# Patient Record
Sex: FEMALE | Race: WHITE | NOT HISPANIC OR LATINO | URBAN - METROPOLITAN AREA
[De-identification: names, ages, dates, MRNs, and addresses within clinical notes are randomized per-mention and may not be internally consistent; named-entity substitution may affect disease eponyms.]

---

## 2017-09-07 ENCOUNTER — IMPORTED ENCOUNTER (OUTPATIENT)
Dept: URBAN - METROPOLITAN AREA CLINIC 50 | Facility: CLINIC | Age: 57
End: 2017-09-07

## 2017-10-05 ENCOUNTER — IMPORTED ENCOUNTER (OUTPATIENT)
Dept: URBAN - METROPOLITAN AREA CLINIC 50 | Facility: CLINIC | Age: 57
End: 2017-10-05

## 2017-10-18 ENCOUNTER — IMPORTED ENCOUNTER (OUTPATIENT)
Dept: URBAN - METROPOLITAN AREA CLINIC 50 | Facility: CLINIC | Age: 57
End: 2017-10-18

## 2017-11-29 ENCOUNTER — IMPORTED ENCOUNTER (OUTPATIENT)
Dept: URBAN - METROPOLITAN AREA CLINIC 50 | Facility: CLINIC | Age: 57
End: 2017-11-29

## 2017-11-29 NOTE — PATIENT DISCUSSION
"""Continue Artificial Tears both eyes two - four times a day. "" ""Continue Izzy Buys both eyes twice a day.  """

## 2017-11-30 ENCOUNTER — IMPORTED ENCOUNTER (OUTPATIENT)
Dept: URBAN - METROPOLITAN AREA CLINIC 50 | Facility: CLINIC | Age: 57
End: 2017-11-30

## 2017-12-20 ENCOUNTER — IMPORTED ENCOUNTER (OUTPATIENT)
Dept: URBAN - METROPOLITAN AREA CLINIC 50 | Facility: CLINIC | Age: 57
End: 2017-12-20

## 2018-01-18 ENCOUNTER — IMPORTED ENCOUNTER (OUTPATIENT)
Dept: URBAN - METROPOLITAN AREA CLINIC 50 | Facility: CLINIC | Age: 58
End: 2018-01-18

## 2018-05-16 ENCOUNTER — IMPORTED ENCOUNTER (OUTPATIENT)
Dept: URBAN - METROPOLITAN AREA CLINIC 50 | Facility: CLINIC | Age: 58
End: 2018-05-16

## 2020-02-20 ENCOUNTER — IMPORTED ENCOUNTER (OUTPATIENT)
Dept: URBAN - METROPOLITAN AREA CLINIC 50 | Facility: CLINIC | Age: 60
End: 2020-02-20

## 2020-10-15 ENCOUNTER — APPOINTMENT (RX ONLY)
Dept: URBAN - METROPOLITAN AREA CLINIC 58 | Facility: CLINIC | Age: 60
Setting detail: DERMATOLOGY
End: 2020-10-15

## 2020-10-15 DIAGNOSIS — K13.0 DISEASES OF LIPS: ICD-10-CM | Status: RESOLVING

## 2020-10-15 PROCEDURE — ? ADDITIONAL NOTES

## 2020-10-15 PROCEDURE — ? COUNSELING

## 2020-10-15 PROCEDURE — ? PRESCRIPTION

## 2020-10-15 PROCEDURE — ? RECOMMENDATIONS

## 2020-10-15 PROCEDURE — 99202 OFFICE O/P NEW SF 15 MIN: CPT

## 2020-10-15 RX ORDER — TRIAMCINOLONE ACETONIDE 0.25 MG/G
OINTMENT TOPICAL
Qty: 2 | Refills: 1 | Status: ERX | COMMUNITY
Start: 2020-10-15

## 2020-10-15 RX ADMIN — TRIAMCINOLONE ACETONIDE: 0.25 OINTMENT TOPICAL at 00:00

## 2020-10-15 ASSESSMENT — LOCATION SIMPLE DESCRIPTION DERM: LOCATION SIMPLE: RIGHT LIP

## 2020-10-15 ASSESSMENT — LOCATION ZONE DERM: LOCATION ZONE: LIP

## 2020-10-15 ASSESSMENT — LOCATION DETAILED DESCRIPTION DERM: LOCATION DETAILED: RIGHT INFERIOR VERMILION LIP

## 2020-10-15 NOTE — PROCEDURE: ADDITIONAL NOTES
Additional Notes: Patient has been getting bad bouts of swollen, red, blistering lips for years. She and her  state that this happens every time that she takes an antibiotic. She frequently takes both oral and IV abx prescribed by pulmonologist for bronchiectasis. Within days of starting levofloxacin or ciprofloxacin, she states that her lips swell, blister, chap, and break. This has also happened in the past with doxycycline but to lesser extent. She was not aware that doxycycline is photosensitizing and she is always outdoors playing [employer] ball sometimes 2-3 times per day without lip protection. Discussed potential serious drug allergy to FQ antibiotic class. She has never discussed this with her pulmonologist. Pulmonologist should be made aware of these reactions for potential for progression to SJS etc. \\n\\nToday, lips are normal size without edema. They are chapped and uncomfortable without blistering. Small residual fissure. Pt and  state that they were recently flared because she had been on a course of Cipro recently and they are much resolved since then. TAC BID X2 weeks for healing, should apply Aquaphor several times daily and SPF lip balm when she is outdoors. Recommended EltaMD Sport for use on the face including lips. Additional Notes: Patient has been getting bad bouts of swollen, red, blistering lips for years. She and her  state that this happens every time that she takes an antibiotic. She frequently takes both oral and IV abx prescribed by pulmonologist for bronchiectasis. Within days of starting levofloxacin or ciprofloxacin, she states that her lips swell, blister, chap, and break. This has also happened in the past with doxycycline but to lesser extent. She was not aware that doxycycline is photosensitizing and she is always outdoors playing [employer] ball sometimes 2-3 times per day without lip protection. Discussed potential serious drug allergy to FQ antibiotic class. She has never discussed this with her pulmonologist. Pulmonologist should be made aware of these reactions for potential for progression to SJS etc. \\n\\nToday, lips are normal size without edema. They are chapped and uncomfortable without blistering. Small residual fissure. Pt and  state that they were recently flared because she had been on a course of Cipro recently and they are much resolved since then. TAC BID X2 weeks for healing, should apply Aquaphor several times daily and SPF lip balm when she is outdoors. Recommended EltaMD Sport for use on the face including lips.

## 2020-11-12 ENCOUNTER — APPOINTMENT (RX ONLY)
Dept: URBAN - METROPOLITAN AREA CLINIC 58 | Facility: CLINIC | Age: 60
Setting detail: DERMATOLOGY
End: 2020-11-12

## 2020-11-12 DIAGNOSIS — K13.0 DISEASES OF LIPS: ICD-10-CM

## 2020-11-12 PROCEDURE — ? RECOMMENDATIONS

## 2020-11-12 PROCEDURE — 99213 OFFICE O/P EST LOW 20 MIN: CPT

## 2020-11-12 PROCEDURE — ? ADDITIONAL NOTES

## 2020-11-12 PROCEDURE — ? COUNSELING

## 2020-11-12 ASSESSMENT — LOCATION ZONE DERM: LOCATION ZONE: LIP

## 2020-11-12 ASSESSMENT — LOCATION SIMPLE DESCRIPTION DERM: LOCATION SIMPLE: RIGHT LIP

## 2020-11-12 ASSESSMENT — LOCATION DETAILED DESCRIPTION DERM: LOCATION DETAILED: RIGHT INFERIOR VERMILION LIP

## 2020-11-12 NOTE — PROCEDURE: ADDITIONAL NOTES
Additional Notes: Patient presents today stating that she is no better from last visit. Lips today appear better, though she still reports discomfort, rawness, feeling chapped. Last dose of Cipro 1 month ago when her inflammation, edema, exfoliation began. She has been using TAC oint 0.025 BID since the.  She has just returned from [place] where it was 30 degrees and windy, discussed how this could have prevented some healing. She also eats a grapefruit every morning, discussed how the acidity can additionally prevent the healing of her chapped lips. She wears only synthetic surgical masks throughout the day. Recommended that she try switching to cotton masks and wash in ultra gentle detergent after each use. She also uses an array of anti-aging facial products including Regenerist etc. Recommended that she d/c use at this time to rule out sensitivity to AHAs or fragrances in these products. Lastly, reiterated that she needs to relay to her pulmonologist her allergy to FQ antibiotics, as she reports that this swelling of the lips, tongue, face occurs every time that she takes Levaquin or cipro and worsens throughout the course. She has not yet made them aware so that they do not prescribe again. \\n\\nGentle skin care only x2 weeks. Consider biopsy if not resolving.
Detail Level: Simple

## 2021-02-23 ENCOUNTER — IMPORTED ENCOUNTER (OUTPATIENT)
Dept: URBAN - METROPOLITAN AREA CLINIC 50 | Facility: CLINIC | Age: 61
End: 2021-02-23

## 2021-04-17 ASSESSMENT — VISUAL ACUITY
OD_BAT: 20/50
OS_CC: 20/70-2
OD_CC: J1+
OD_CC: 20/25+2
OS_CC: 20/80-1
OD_CC: 20/25-2
OD_CC: 20/20
OS_CC: J1
OS_CC: 20/80-1
OD_OTHER: 20/50. >20/400.
OS_CC: J1+
OD_CC: J1+
OS_CC: J1+
OD_CC: 20/20
OS_BAT: >20/400
OS_OTHER: >20/400.
OD_CC: J1
OD_CC: J1+
OD_CC: 20/20
OS_CC: J1+
OS_CC: 20/70
OS_CC: 20/70+1

## 2021-04-17 ASSESSMENT — TONOMETRY
OD_IOP_MMHG: 14
OS_IOP_MMHG: 14
OS_IOP_MMHG: 12
OD_IOP_MMHG: 12
OD_IOP_MMHG: 14
OS_IOP_MMHG: 14
OS_IOP_MMHG: 14
OD_IOP_MMHG: 13
OD_IOP_MMHG: 13
OS_IOP_MMHG: 15

## 2022-02-16 ENCOUNTER — PREPPED CHART (OUTPATIENT)
Dept: URBAN - METROPOLITAN AREA CLINIC 53 | Facility: CLINIC | Age: 62
End: 2022-02-16